# Patient Record
Sex: MALE | Race: WHITE | URBAN - METROPOLITAN AREA
[De-identification: names, ages, dates, MRNs, and addresses within clinical notes are randomized per-mention and may not be internally consistent; named-entity substitution may affect disease eponyms.]

---

## 2019-09-18 ENCOUNTER — EMERGENCY (EMERGENCY)
Facility: HOSPITAL | Age: 39
LOS: 0 days | Discharge: LEFT BEFORE DISCHARGE | End: 2019-09-18
Attending: EMERGENCY MEDICINE | Admitting: EMERGENCY MEDICINE
Payer: COMMERCIAL

## 2019-09-18 VITALS
HEART RATE: 80 BPM | WEIGHT: 209.44 LBS | SYSTOLIC BLOOD PRESSURE: 136 MMHG | TEMPERATURE: 97 F | RESPIRATION RATE: 18 BRPM | DIASTOLIC BLOOD PRESSURE: 85 MMHG | HEIGHT: 72 IN | OXYGEN SATURATION: 98 %
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
## 2019-09-18 DIAGNOSIS — R07.9 CHEST PAIN, UNSPECIFIED: ICD-10-CM
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
## 2019-09-18 DIAGNOSIS — R07.89 OTHER CHEST PAIN: ICD-10-CM

## 2019-09-18 PROCEDURE — 93010 ELECTROCARDIOGRAM REPORT: CPT

## 2019-09-18 PROCEDURE — 99283 EMERGENCY DEPT VISIT LOW MDM: CPT

## 2019-09-18 NOTE — ED ADULT TRIAGE NOTE - CHIEF COMPLAINT QUOTE
I feel something here (left anterior chest), I work twenty hour days, I took a shower and broke out in a cold sweat, my tongue felt funny and a little SOB - patient

## 2019-09-18 NOTE — ED PROVIDER NOTE - PHYSICAL EXAMINATION
Physical Exam    Vital Signs: I have reviewed the initial vital signs  Constitutional: well-nourished, appears stated age, no acute distress  EENT: Conjunctiva pink, Sclera clear, PERRLA, EOMI. Mucous membranes moist, no exudates or lesions noted, uvula midline.  Cardiovascular: S1 and S2 present, regular rate, regular rhythm. Well perfused extremities, no peripheral edema  Respiratory: unlabored respiratory effort, clear to auscultation bilaterally no wheezing, rales or rhonchi  Integumentary: warm, dry, no rash  Psychiatric: appropriate mood, appropriate affect

## 2019-09-18 NOTE — ED PROVIDER NOTE - CLINICAL SUMMARY MEDICAL DECISION MAKING FREE TEXT BOX
the patient was seen by BELLA Oscar and left prior to being assessed by me and also prior to initiation of any workup beyond triage

## 2019-09-18 NOTE — ED PROVIDER NOTE - NS ED ROS FT
Review of Systems         Constitutional: (-) fever (-) chills (-) weakness       EENT:  (-) sore throat       Cardiovascular: (+) chest pain (-) syncope       Respiratory: (-) cough, (-) shortness of breath       Gastrointestinal: (-) abdominal pain (-) vomiting (-) diarrhea (-) nausea       Musculoskeletal: (-) neck pain (-) back pain       Integumentary: (-) rash       Neurological: (-) headache (-) altered mental status (-) dizziness        Psych: (-) psych history

## 2019-09-18 NOTE — ED PROVIDER NOTE - OBJECTIVE STATEMENT
39 year old male no past medical history presenting with chest pain. 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 chest pain lasted an hour then resolved, worse with movement of left arm. Pt states he was reading wedb md and getting concerned so decided to come in. Non smoker, no cocaine use, no HLD, HTN, + FH CAD. Denies fevers, chills, headache, chest pain, shortness of breath, abdominal pain, nausea, vomiting, diarrhea, dysuria. Patient states he wants to go home because he has work in AM, discussed with patient that EKG is not enough to r/o pathology of chest pain.

## 2021-11-16 NOTE — ED PROVIDER NOTE - NO SIGNIFICANT PAST SURGICAL HISTORY
DR Nichole  I am requesting that you see this patient in a timely way.     He is a patient Kaleb has talk to you about.       <<----- Click to add NO significant Past Surgical History

## 2023-01-12 PROBLEM — Z00.00 ENCOUNTER FOR PREVENTIVE HEALTH EXAMINATION: Status: ACTIVE | Noted: 2023-01-12

## 2023-01-12 PROBLEM — Z78.9 OTHER SPECIFIED HEALTH STATUS: Chronic | Status: ACTIVE | Noted: 2019-09-18

## 2023-01-13 ENCOUNTER — APPOINTMENT (OUTPATIENT)
Dept: CARDIOLOGY | Facility: CLINIC | Age: 43
End: 2023-01-13
Payer: COMMERCIAL

## 2023-01-13 VITALS
BODY MASS INDEX: 30.61 KG/M2 | HEART RATE: 69 BPM | DIASTOLIC BLOOD PRESSURE: 70 MMHG | SYSTOLIC BLOOD PRESSURE: 110 MMHG | WEIGHT: 226 LBS | TEMPERATURE: 96 F | HEIGHT: 72 IN

## 2023-01-13 DIAGNOSIS — E78.5 HYPERLIPIDEMIA, UNSPECIFIED: ICD-10-CM

## 2023-01-13 PROCEDURE — 93000 ELECTROCARDIOGRAM COMPLETE: CPT

## 2023-01-13 PROCEDURE — 99204 OFFICE O/P NEW MOD 45 MIN: CPT | Mod: 25

## 2023-01-13 RX ORDER — METOPROLOL TARTRATE 50 MG/1
50 TABLET, FILM COATED ORAL
Qty: 2 | Refills: 0 | Status: ACTIVE | COMMUNITY
Start: 2023-01-13 | End: 1900-01-01

## 2023-01-21 NOTE — ED ADULT NURSE NOTE - NSSEPSISSUSPECTED_ED_A_ED
Unable to Assess: Patient left before being evaluated Patient requests all Lab, Cardiology, and Radiology Results on their Discharge Instructions

## 2023-01-22 PROBLEM — E78.5 HYPERLIPIDEMIA: Status: ACTIVE | Noted: 2023-01-22

## 2023-01-22 NOTE — ASSESSMENT
[FreeTextEntry1] : Middle age male with several cardiac risk factors.\par Atypical chest pain.\par \par Modest hyperlipidemia.
Well-developed, well nourished

## 2023-01-22 NOTE — PHYSICAL EXAM
[de-identified] : well appearing, no distress [de-identified] : reg, nL s1/s2, no m/r/g [de-identified] : CTA [de-identified] : alert normalaffect, logical conversation

## 2023-01-22 NOTE — PHYSICAL EXAM
[de-identified] : reg, nL s1/s2, no m/r/g [de-identified] : well appearing, no distress [de-identified] : CTA [de-identified] : alert normalaffect, logical conversation

## 2023-01-22 NOTE — ASSESSMENT
[FreeTextEntry1] : Middle age male with several cardiac risk factors.\par Atypical chest pain.\par \par Modest hyperlipidemia.

## 2023-01-22 NOTE — DISCUSSION/SUMMARY
[FreeTextEntry1] : CCTA to evaluate for CAD / risk stratify.\par ECHO\par Labs\par Consider statin pending above\par Follow-up 3-months.

## 2023-01-22 NOTE — HISTORY OF PRESENT ILLNESS
[FreeTextEntry1] : 42 year-old male presents for evaluation.\par \par No cardiac history.\par Risk factors include hyperlipidemia and family history.\par \par Feels well.\par \par Vague chest / left arm pain.  Random / intermittent without clear precipitant.  Not exertional  Self limited.  No associated symptoms.\par \par Exercise / physical job without exertional symptoms.\par \par Breathing comfortable.\par \par No palpitations, lightheadedness, syncope.\par \par \par PMH / PSH:\par None\par \par SOCIAL:\par Nonsmoker\par \par FAMILY:\par Father / paternal uncle (CAD)\par \par \par Labs Reviewed (4/15/22):\par   HDL 57  TRI 58\par CBC / CMP unremarkable

## 2023-03-13 ENCOUNTER — APPOINTMENT (OUTPATIENT)
Dept: CARDIOLOGY | Facility: CLINIC | Age: 43
End: 2023-03-13

## 2023-03-22 ENCOUNTER — APPOINTMENT (OUTPATIENT)
Dept: CARDIOLOGY | Facility: CLINIC | Age: 43
End: 2023-03-22
Payer: COMMERCIAL

## 2023-03-22 DIAGNOSIS — R07.9 CHEST PAIN, UNSPECIFIED: ICD-10-CM

## 2023-03-22 DIAGNOSIS — Z13.6 ENCOUNTER FOR SCREENING FOR CARDIOVASCULAR DISORDERS: ICD-10-CM

## 2023-03-22 PROCEDURE — 93306 TTE W/DOPPLER COMPLETE: CPT

## 2023-03-25 PROBLEM — R07.9 CHEST PAIN: Status: ACTIVE | Noted: 2023-01-13

## 2023-03-25 PROBLEM — Z13.6 SCREENING FOR CARDIOVASCULAR CONDITION: Status: ACTIVE | Noted: 2023-01-22

## 2024-02-28 NOTE — ED ADULT TRIAGE NOTE - PRO INTERPRETER NEED 2
After obtaining consent, and per orders of Dr. Crystal Alvarado, the injection of Depo-Medrol 80 mg was given by Vidhi Suárez CMA. Patient instructed to remain in clinic for 20 minutes afterwards, and to report any adverse reaction to me immediately.       English

## 2024-05-02 ENCOUNTER — EMERGENCY (EMERGENCY)
Facility: HOSPITAL | Age: 44
LOS: 0 days | Discharge: HOME | End: 2024-05-02
Attending: EMERGENCY MEDICINE
Payer: COMMERCIAL

## 2024-05-02 VITALS
SYSTOLIC BLOOD PRESSURE: 147 MMHG | RESPIRATION RATE: 18 BRPM | HEART RATE: 88 BPM | TEMPERATURE: 98 F | DIASTOLIC BLOOD PRESSURE: 77 MMHG | OXYGEN SATURATION: 100 %

## 2024-05-02 DIAGNOSIS — M79.671 PAIN IN RIGHT FOOT: ICD-10-CM
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
## 2024-05-02 DIAGNOSIS — S92.501A DISPLACED UNSPECIFIED FRACTURE OF RIGHT LESSER TOE(S), INITIAL ENCOUNTER FOR CLOSED FRACTURE: ICD-10-CM
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
## 2024-05-02 PROCEDURE — 99284 EMERGENCY DEPT VISIT MOD MDM: CPT | Mod: 57

## 2024-05-02 PROCEDURE — 73630 X-RAY EXAM OF FOOT: CPT | Mod: 26,RT

## 2024-05-02 PROCEDURE — 73630 X-RAY EXAM OF FOOT: CPT | Mod: RT

## 2024-05-02 PROCEDURE — 99283 EMERGENCY DEPT VISIT LOW MDM: CPT | Mod: 25

## 2024-05-02 PROCEDURE — 28510 TREATMENT OF TOE FRACTURE: CPT | Mod: 54,T7

## 2024-05-02 NOTE — ED PROVIDER NOTE - OBJECTIVE STATEMENT
44-year-old male no significant past medical history comes to the emergency room for right foot injury.  Patient states yesterday around 2 PM while at work an approximate 3000 pound cart ran over part of his foot.  Patient states he felt fine at first was in pain but over the night and this morning pain is gotten worse.

## 2024-05-02 NOTE — ED PROVIDER NOTE - PHYSICAL EXAMINATION
Physical Exam    Vital Signs: I have reviewed the initial vital signs.  Constitutional: well-nourished, appears stated age, no acute distress  Musculoskeletal: Positive right foot second third and fourth toe swelling with second toe ecchymosis noted and tenderness over metatarsal heads.  DP pulses equal.  No ankle tenderness  Integumentary: warm, dry, no rash  Neurologic: awake, alert, extremities’ motor and sensory functions grossly intact  Psychiatric: appropriate mood, appropriate affect

## 2024-05-02 NOTE — ED PROVIDER NOTE - NSFOLLOWUPINSTRUCTIONS_ED_ALL_ED_FT
Follow up with orthopedic or podiatry in 1-2 days    Toe Fracture    WHAT YOU NEED TO KNOW:    A toe fracture is a break in 1 or more of the bones in your toe. It is most commonly caused by a direct blow to the toe. The bones in your toe may just be broken, or they may be out of place or . Foot Anatomy         DISCHARGE INSTRUCTIONS:    Return to the emergency department if:     Blood soaks through your bandage.      You have severe pain in your toe.      Your toe is cold or numb.    Contact your healthcare provider if:     You have a fever.      Your pain does not go away, even after treatment.      Your toe continues to hurt even after it has healed.      You have questions or concerns about your condition or care.    Medicines: You may need any of the following:     NSAIDs, such as ibuprofen, help decrease swelling, pain, and fever. This medicine is available with or without a doctor's order. NSAIDs can cause stomach bleeding or kidney problems in certain people. If you take blood thinner medicine, always ask your healthcare provider if NSAIDs are safe for you. Always read the medicine label and follow directions.      Prescription pain medicine may be given. Ask your healthcare provider how to take this medicine safely. Some prescription pain medicines contain acetaminophen. Do not take other medicines that contain acetaminophen without talking to your healthcare provider. Too much acetaminophen may cause liver damage. Prescription pain medicine may cause constipation. Ask your healthcare provider how to prevent or treat constipation.       Antibiotics treat a bacterial infection. You may need antibiotics if you have an open wound.      Take your medicine as directed. Contact your healthcare provider if you think your medicine is not helping or if you have side effects. Tell him of her if you are allergic to any medicine. Keep a list of the medicines, vitamins, and herbs you take. Include the amounts, and when and why you take them. Bring the list or the pill bottles to follow-up visits. Carry your medicine list with you in case of an emergency.    Self-care:     Use ant tape, an elastic bandage, or a splint as directed. These help keep your toe in its correct position as it heals. Ant tape is when your fractured toe and the toe next to it are taped together.       Use support devices including a cane, crutches, walking boot, or hard soled shoe as directed. These help protect your broken toe and limit movement so it can heal.Walking Boot           Rest your toe so that it can heal. Return to normal activities as directed.      Apply ice on your toe for 15 to 20 minutes every hour or as directed. Use an ice pack, or put crushed ice in a plastic bag. Cover it with a towel. Ice helps prevent tissue damage and decreases swelling and pain.      Elevate your toe above the level of your heart as often as you can. This will help decrease swelling and pain. Prop your toe on pillows or blankets to keep it elevated comfortably.     Follow up with your healthcare provider as directed: You may need to see a podiatrist in 1 to 2 weeks. Write down your questions so you remember to ask them during your visits.        © Copyright [employer] 2019 All illustrations and images included in CareNotes are the copyrighted property of [employer] or [employer]

## 2024-05-02 NOTE — ED PROVIDER NOTE - CARE PROVIDER_API CALL
Zoran Ruiz  Orthopaedic Surgery  [address] [place] [address]  Phone: [phone]  Fax: [phone]  Follow Up Time:     Wilbert Owen  Podiatric Medicine and Surgery  [address] [place] [address]  Phone: [phone]  Fax: [phone]  Follow Up Time:

## 2024-05-02 NOTE — ED ADULT NURSE NOTE - NSFALLUNIVINTERV_ED_ALL_ED
Bed/Stretcher in lowest position, wheels locked, appropriate side rails in place/Call bell, personal items and telephone in reach/Instruct patient to call for assistance before getting out of bed/chair/stretcher/Non-slip footwear applied when patient is off stretcher/[place] to call system/Physically safe environment - no spills, clutter or unnecessary equipment/Purposeful proactive rounding/Room/bathroom lighting operational, light cord in reach

## 2024-05-02 NOTE — ED PROVIDER NOTE - CLINICAL SUMMARY MEDICAL DECISION MAKING FREE TEXT BOX
Patient with right foot pain after a cart rolled over his foot.  Here patient about the 2 fractures of his toes.  Miguel tape Hard sole shoe outpatient follow-up advised.

## 2024-05-02 NOTE — ED PROVIDER NOTE - PATIENT PORTAL LINK FT
You can access the FollowMyHealth Patient Portal offered by [facility] by registering at the following website: [email or web address]. By joining [employer]’s FollowMyHealth portal, you will also be able to view your health information using other applications (apps) compatible with our system.